# Patient Record
Sex: FEMALE | Race: WHITE | NOT HISPANIC OR LATINO | Employment: FULL TIME | URBAN - METROPOLITAN AREA
[De-identification: names, ages, dates, MRNs, and addresses within clinical notes are randomized per-mention and may not be internally consistent; named-entity substitution may affect disease eponyms.]

---

## 2023-08-29 ENCOUNTER — HOSPITAL ENCOUNTER (OUTPATIENT)
Dept: DATA CONVERSION | Facility: HOSPITAL | Age: 43
Discharge: HOME | End: 2023-08-29

## 2023-08-29 DIAGNOSIS — Z00.00 ENCOUNTER FOR GENERAL ADULT MEDICAL EXAMINATION WITHOUT ABNORMAL FINDINGS: ICD-10-CM

## 2023-08-29 DIAGNOSIS — D72.829 ELEVATED WHITE BLOOD CELL COUNT, UNSPECIFIED: ICD-10-CM

## 2023-08-29 DIAGNOSIS — D50.9 IRON DEFICIENCY ANEMIA, UNSPECIFIED: ICD-10-CM

## 2023-08-29 LAB
ALBUMIN SERPL-MCNC: 4.1 GM/DL (ref 3.5–5)
ALBUMIN/GLOB SERPL: 1.3 RATIO (ref 1.5–3)
ALP BLD-CCNC: 80 U/L (ref 35–125)
ALT SERPL-CCNC: 18 U/L (ref 5–40)
ANION GAP SERPL CALCULATED.3IONS-SCNC: 12 MMOL/L (ref 0–19)
AST SERPL-CCNC: 17 U/L (ref 5–40)
BILIRUB SERPL-MCNC: 0.2 MG/DL (ref 0.1–1.2)
BUN SERPL-MCNC: 11 MG/DL (ref 8–25)
BUN/CREAT SERPL: 13.8 RATIO (ref 8–21)
CALCIUM SERPL-MCNC: 9 MG/DL (ref 8.5–10.4)
CHLORIDE SERPL-SCNC: 104 MMOL/L (ref 97–107)
CO2 SERPL-SCNC: 23 MMOL/L (ref 24–31)
CREAT SERPL-MCNC: 0.8 MG/DL (ref 0.4–1.6)
DEPRECATED RDW RBC AUTO: 43.9 FL (ref 37–54)
ERYTHROCYTE [DISTWIDTH] IN BLOOD BY AUTOMATED COUNT: 13.5 % (ref 11.7–15)
GFR SERPL CREATININE-BSD FRML MDRD: 95 ML/MIN/1.73 M2
GLOBULIN SER-MCNC: 3.2 G/DL (ref 1.9–3.7)
GLUCOSE SERPL-MCNC: 86 MG/DL (ref 65–99)
HCT VFR BLD AUTO: 37.7 % (ref 36–44)
HGB BLD-MCNC: 12.2 GM/DL (ref 12–15)
IRON SATN MFR SERPL: 7.9 % (ref 12–50)
IRON SERPL-MCNC: 31 UG/DL (ref 30–160)
MCH RBC QN AUTO: 28.8 PG (ref 26–34)
MCHC RBC AUTO-ENTMCNC: 32.4 % (ref 31–37)
MCV RBC AUTO: 88.9 FL (ref 80–100)
NRBC BLD-RTO: 0 /100 WBC
PLATELET # BLD AUTO: 376 K/UL (ref 150–450)
PMV BLD AUTO: 9 CU (ref 7–12.6)
POTASSIUM SERPL-SCNC: 4.4 MMOL/L (ref 3.4–5.1)
PROT SERPL-MCNC: 7.3 G/DL (ref 5.9–7.9)
RBC # BLD AUTO: 4.24 M/UL (ref 4–4.9)
SODIUM SERPL-SCNC: 139 MMOL/L (ref 133–145)
TIBC SERPL-MCNC: 390 UG/DL (ref 228–428)
WBC # BLD AUTO: 7.2 K/UL (ref 4.5–11)

## 2023-09-16 VITALS
TEMPERATURE: 97.5 F | HEIGHT: 65 IN | SYSTOLIC BLOOD PRESSURE: 118 MMHG | WEIGHT: 263 LBS | HEART RATE: 77 BPM | DIASTOLIC BLOOD PRESSURE: 72 MMHG | BODY MASS INDEX: 43.82 KG/M2 | OXYGEN SATURATION: 98 %

## 2023-09-20 PROBLEM — D72.829 LEUKOCYTOSIS: Status: ACTIVE | Noted: 2023-09-20

## 2023-09-20 PROBLEM — F41.9 ANXIETY: Status: ACTIVE | Noted: 2023-09-20

## 2023-09-20 PROBLEM — D50.9 IRON DEFICIENCY ANEMIA: Status: ACTIVE | Noted: 2023-09-20

## 2023-09-20 PROBLEM — D50.9 MICROCYTIC ANEMIA: Status: ACTIVE | Noted: 2023-09-20

## 2023-09-20 PROBLEM — F41.1 ANXIETY, GENERALIZED: Status: ACTIVE | Noted: 2023-09-20

## 2023-09-20 PROBLEM — E66.01 MORBID (SEVERE) OBESITY DUE TO EXCESS CALORIES (MULTI): Status: ACTIVE | Noted: 2023-09-20

## 2023-09-20 PROBLEM — E78.5 HYPERLIPIDEMIA: Status: ACTIVE | Noted: 2023-09-20

## 2023-09-20 RX ORDER — ASCORBIC ACID 500 MG
TABLET ORAL
COMMUNITY

## 2023-09-20 RX ORDER — ALBUTEROL SULFATE 90 UG/1
AEROSOL, METERED RESPIRATORY (INHALATION)
COMMUNITY
Start: 2016-02-17 | End: 2024-02-29 | Stop reason: WASHOUT

## 2023-09-20 RX ORDER — BENZONATATE 200 MG/1
CAPSULE ORAL
COMMUNITY
Start: 2016-02-17 | End: 2024-02-29 | Stop reason: WASHOUT

## 2023-09-20 RX ORDER — ESCITALOPRAM OXALATE 20 MG/1
TABLET, FILM COATED ORAL
COMMUNITY
End: 2024-02-29 | Stop reason: SDUPTHER

## 2023-09-20 RX ORDER — AMOXICILLIN AND CLAVULANATE POTASSIUM 875; 125 MG/1; MG/1
TABLET, FILM COATED ORAL
COMMUNITY
Start: 2016-02-17 | End: 2024-02-29 | Stop reason: WASHOUT

## 2023-09-20 RX ORDER — ESCITALOPRAM OXALATE 10 MG/1
TABLET ORAL
COMMUNITY
Start: 2023-07-18 | End: 2024-02-29 | Stop reason: WASHOUT

## 2023-09-20 RX ORDER — FERROUS SULFATE 325(65) MG
TABLET, DELAYED RELEASE (ENTERIC COATED) ORAL
COMMUNITY
Start: 2022-02-09

## 2024-02-29 ENCOUNTER — LAB (OUTPATIENT)
Dept: LAB | Facility: LAB | Age: 44
End: 2024-02-29
Payer: COMMERCIAL

## 2024-02-29 ENCOUNTER — OFFICE VISIT (OUTPATIENT)
Dept: PRIMARY CARE | Facility: CLINIC | Age: 44
End: 2024-02-29
Payer: COMMERCIAL

## 2024-02-29 VITALS
TEMPERATURE: 95 F | BODY MASS INDEX: 45.16 KG/M2 | SYSTOLIC BLOOD PRESSURE: 122 MMHG | HEIGHT: 66 IN | HEART RATE: 78 BPM | DIASTOLIC BLOOD PRESSURE: 84 MMHG | WEIGHT: 281 LBS | OXYGEN SATURATION: 97 % | RESPIRATION RATE: 18 BRPM

## 2024-02-29 DIAGNOSIS — D50.8 IRON DEFICIENCY ANEMIA SECONDARY TO INADEQUATE DIETARY IRON INTAKE: ICD-10-CM

## 2024-02-29 DIAGNOSIS — F41.1 ANXIETY, GENERALIZED: Primary | ICD-10-CM

## 2024-02-29 LAB
HCT VFR BLD AUTO: 39.4 % (ref 36–46)
HGB BLD-MCNC: 12.7 G/DL (ref 12–16)
IRON SATN MFR SERPL: 30 % (ref 12–50)
IRON SERPL-MCNC: 108 UG/DL (ref 30–160)
TIBC SERPL-MCNC: 362 UG/DL (ref 228–428)
UIBC SERPL-MCNC: 254 UG/DL (ref 110–370)

## 2024-02-29 PROCEDURE — 99214 OFFICE O/P EST MOD 30 MIN: CPT | Performed by: FAMILY MEDICINE

## 2024-02-29 PROCEDURE — 85018 HEMOGLOBIN: CPT

## 2024-02-29 PROCEDURE — 36415 COLL VENOUS BLD VENIPUNCTURE: CPT

## 2024-02-29 PROCEDURE — 83540 ASSAY OF IRON: CPT

## 2024-02-29 PROCEDURE — 1036F TOBACCO NON-USER: CPT | Performed by: FAMILY MEDICINE

## 2024-02-29 PROCEDURE — 85014 HEMATOCRIT: CPT

## 2024-02-29 PROCEDURE — 83550 IRON BINDING TEST: CPT

## 2024-02-29 RX ORDER — ESCITALOPRAM OXALATE 20 MG/1
TABLET ORAL
Qty: 90 TABLET | Refills: 1 | Status: SHIPPED | OUTPATIENT
Start: 2024-02-29

## 2024-02-29 ASSESSMENT — ANXIETY QUESTIONNAIRES
3. WORRYING TOO MUCH ABOUT DIFFERENT THINGS: SEVERAL DAYS
7. FEELING AFRAID AS IF SOMETHING AWFUL MIGHT HAPPEN: NOT AT ALL
6. BECOMING EASILY ANNOYED OR IRRITABLE: NOT AT ALL
2. NOT BEING ABLE TO STOP OR CONTROL WORRYING: NOT AT ALL
4. TROUBLE RELAXING: NOT AT ALL
GAD7 TOTAL SCORE: 1
5. BEING SO RESTLESS THAT IT IS HARD TO SIT STILL: NOT AT ALL
1. FEELING NERVOUS, ANXIOUS, OR ON EDGE: NOT AT ALL
IF YOU CHECKED OFF ANY PROBLEMS ON THIS QUESTIONNAIRE, HOW DIFFICULT HAVE THESE PROBLEMS MADE IT FOR YOU TO DO YOUR WORK, TAKE CARE OF THINGS AT HOME, OR GET ALONG WITH OTHER PEOPLE: NOT DIFFICULT AT ALL

## 2024-02-29 ASSESSMENT — PATIENT HEALTH QUESTIONNAIRE - PHQ9
SUM OF ALL RESPONSES TO PHQ9 QUESTIONS 1 AND 2: 0
1. LITTLE INTEREST OR PLEASURE IN DOING THINGS: NOT AT ALL
2. FEELING DOWN, DEPRESSED OR HOPELESS: NOT AT ALL

## 2024-02-29 ASSESSMENT — LIFESTYLE VARIABLES
AUDIT-C TOTAL SCORE: 1
HOW OFTEN DO YOU HAVE SIX OR MORE DRINKS ON ONE OCCASION: NEVER
SKIP TO QUESTIONS 9-10: 1
HOW OFTEN DO YOU HAVE A DRINK CONTAINING ALCOHOL: MONTHLY OR LESS
HOW MANY STANDARD DRINKS CONTAINING ALCOHOL DO YOU HAVE ON A TYPICAL DAY: 1 OR 2

## 2024-02-29 ASSESSMENT — PAIN SCALES - GENERAL: PAINLEVEL: 0-NO PAIN

## 2024-02-29 NOTE — PATIENT INSTRUCTIONS
Problem List Items Addressed This Visit             ICD-10-CM    Anxiety, generalized - Primary F41.1    Relevant Medications    escitalopram (Lexapro) 20 mg tablet    Iron deficiency anemia D50.9    Relevant Orders    Iron and TIBC    Hemoglobin and hematocrit, blood       Additional Visit Plans:  Good mood coverage, plan to stay on current dose. Refill provided. Follow up on 6 months.    Continue current iron. Will check levels and let you know if you should keep doing this, do it more or less.     Next Wellness Exam/Annual Physical Due  8/29/2024    Patient Care Team:  Olivier Smith DO as PCP - General (Family Medicine)    Olivier Smith DO   02/29/24   8:09 AM

## 2024-02-29 NOTE — PROGRESS NOTES
Outpatient Visit Note    Chief Complaint   Patient presents with    Anxiety     Anxiety and med follow up       HPI:  Olga Pérez is a 44 y.o. female here for routine medication follow-up.    She is due for complete physical in late August.    She has anxiety with possible PTSD and has had episodes of increased diarrhea when more anxious.  Symptoms have improved on Lexapro and she has had better coverage of her mood on 20 mg once daily.  Recommendations were made for her to see GI as her sister does have ulcerative colitis and IBS for which patient may have a component herself.    Today she states that she likes the Lexapro and feels her mood is well covered.  Not having any concerning side effects.  Has good support.  Not interested in counseling.  Bowel movements have been good for the past few months. She did not see GI and will save that for if symptoms come back.     She has a history of iron deficiency anemia for which she has been on iron supplementation and continues to do so at this time, SlowFe once daily. No constipation or GI upset.  Labs were last completed August at which time iron saturation was low but hemoglobin was normal.  Plan to recheck levels at this time    PHQ9/GAD7:  Over the last 2 weeks, how often have you been bothered by any of the following problems?  Feeling nervous, anxious, or on edge: Not at all  Not being able to stop or control worrying: Not at all  Worrying too much about different things: Several days  Trouble relaxing: Not at all  Being so restless that it is hard to sit still: Not at all  Becoming easily annoyed or irritable: Not at all  Feeling afraid as if something awful might happen: Not at all  RIVKA-7 Total Score: 1      History reviewed. No pertinent past medical history.     Current Medications  Current Outpatient Medications   Medication Instructions    ascorbic acid (Vitamin C) 500 mg tablet as directed Orally    escitalopram (Lexapro) 20 mg tablet 1  tablet Orally Once a day for 90 days    ferrous sulfate 325 (65 Fe) MG EC tablet 1 tablet Orally three times per day    mv,calcium,min/iron/folic/vitK (MULTI FOR HER ORAL) as directed Orally        Allergies  Allergies   Allergen Reactions    Bee Pollens Swelling    Metronidazole Rash        History reviewed. No pertinent surgical history.  Family History   Problem Relation Name Age of Onset    Thyroid disease Mother      Nephrolithiasis Father      Hypertension Father      Cancer Maternal Grandfather       Social History     Tobacco Use    Smoking status: Never     Passive exposure: Never    Smokeless tobacco: Never   Vaping Use    Vaping Use: Never used   Substance Use Topics    Alcohol use: Yes    Drug use: Never     Tobacco Use: Low Risk  (2/29/2024)    Patient History     Smoking Tobacco Use: Never     Smokeless Tobacco Use: Never     Passive Exposure: Never        ROS  All pertinent positive symptoms are included in the history of present illness.  All other systems have been reviewed and are negative and noncontributory to this patient's current ailments.    VITAL SIGNS  Vitals:    02/29/24 0803   BP: 122/84   Pulse: 78   Resp: 18   Temp: 35 °C (95 °F)   SpO2: 97%     Vitals:    02/29/24 0803   Weight: 127 kg (281 lb)      Body mass index is 45.35 kg/m².     PHYSICAL EXAM  GENERAL APPEARANCE: well nourished, well developed, looks like stated age, in no acute distress, not ill or tired appearing, conversing well.   HEENT: no trauma, normocephalic.   NECK: supple without rigidity, no neck mass was observed.   LUNGS: good chest wall expansion. In no acute respiratory distress.   EXTREMITIES: moving all extremities equally with no edema.   SKIN: normal skin color and pigmentation, without rash.   NEUROLOGIC EXAM: CN II-XII grossly intact, normal gait.   PSYCH: mood and affect appropriate; alert and oriented to time, place, person; no difficulty with speech or language.       Assessment/Plan   Problem List Items  Addressed This Visit             ICD-10-CM    Anxiety, generalized - Primary F41.1    Relevant Medications    escitalopram (Lexapro) 20 mg tablet    Iron deficiency anemia D50.9    Relevant Orders    Iron and TIBC (Completed)    Hemoglobin and hematocrit, blood (Completed)       Additional Visit Plans:  Good mood coverage, plan to stay on current dose. Refill provided. Follow up on 6 months.    Continue current iron. Will check levels and let you know if you should keep doing this, do it more or less.     Next Wellness Exam/Annual Physical Due  8/29/2024    Patient Care Team:  Olivier Smith DO as PCP - General (Family Medicine)    Olivier Smith DO   03/05/24   8:19 AM

## 2024-12-13 ENCOUNTER — LAB (OUTPATIENT)
Dept: LAB | Facility: LAB | Age: 44
End: 2024-12-13
Payer: COMMERCIAL

## 2024-12-13 ENCOUNTER — OFFICE VISIT (OUTPATIENT)
Dept: PRIMARY CARE | Facility: CLINIC | Age: 44
End: 2024-12-13
Payer: COMMERCIAL

## 2024-12-13 ENCOUNTER — HOSPITAL ENCOUNTER (OUTPATIENT)
Dept: RADIOLOGY | Facility: HOSPITAL | Age: 44
Discharge: HOME | End: 2024-12-13
Payer: COMMERCIAL

## 2024-12-13 VITALS
DIASTOLIC BLOOD PRESSURE: 76 MMHG | SYSTOLIC BLOOD PRESSURE: 124 MMHG | TEMPERATURE: 96.2 F | BODY MASS INDEX: 47.09 KG/M2 | OXYGEN SATURATION: 98 % | WEIGHT: 293 LBS | HEART RATE: 77 BPM | HEIGHT: 66 IN

## 2024-12-13 DIAGNOSIS — M79.672 PAIN OF LEFT HEEL: ICD-10-CM

## 2024-12-13 DIAGNOSIS — Z00.00 ROUTINE HEALTH MAINTENANCE: Primary | ICD-10-CM

## 2024-12-13 DIAGNOSIS — D50.8 IRON DEFICIENCY ANEMIA SECONDARY TO INADEQUATE DIETARY IRON INTAKE: ICD-10-CM

## 2024-12-13 DIAGNOSIS — M72.2 PLANTAR FASCIITIS, LEFT: ICD-10-CM

## 2024-12-13 DIAGNOSIS — Z12.31 BREAST CANCER SCREENING BY MAMMOGRAM: ICD-10-CM

## 2024-12-13 DIAGNOSIS — Z00.00 ROUTINE HEALTH MAINTENANCE: ICD-10-CM

## 2024-12-13 DIAGNOSIS — F41.1 ANXIETY, GENERALIZED: ICD-10-CM

## 2024-12-13 LAB
ERYTHROCYTE [DISTWIDTH] IN BLOOD BY AUTOMATED COUNT: 13.2 % (ref 11.5–14.5)
HCT VFR BLD AUTO: 38.8 % (ref 36–46)
HGB BLD-MCNC: 12.5 G/DL (ref 12–16)
MCH RBC QN AUTO: 29.7 PG (ref 26–34)
MCHC RBC AUTO-ENTMCNC: 32.2 G/DL (ref 32–36)
MCV RBC AUTO: 92 FL (ref 80–100)
NRBC BLD-RTO: 0 /100 WBCS (ref 0–0)
PLATELET # BLD AUTO: 342 X10*3/UL (ref 150–450)
RBC # BLD AUTO: 4.21 X10*6/UL (ref 4–5.2)
TSH SERPL-ACNC: 1.94 MIU/L (ref 0.44–3.98)
WBC # BLD AUTO: 9.1 X10*3/UL (ref 4.4–11.3)

## 2024-12-13 PROCEDURE — 36415 COLL VENOUS BLD VENIPUNCTURE: CPT

## 2024-12-13 PROCEDURE — 84443 ASSAY THYROID STIM HORMONE: CPT

## 2024-12-13 PROCEDURE — 3008F BODY MASS INDEX DOCD: CPT | Performed by: FAMILY MEDICINE

## 2024-12-13 PROCEDURE — 99214 OFFICE O/P EST MOD 30 MIN: CPT | Performed by: FAMILY MEDICINE

## 2024-12-13 PROCEDURE — 85027 COMPLETE CBC AUTOMATED: CPT

## 2024-12-13 PROCEDURE — 1036F TOBACCO NON-USER: CPT | Performed by: FAMILY MEDICINE

## 2024-12-13 PROCEDURE — 73630 X-RAY EXAM OF FOOT: CPT | Mod: LT

## 2024-12-13 RX ORDER — ESCITALOPRAM OXALATE 20 MG/1
TABLET ORAL
Qty: 90 TABLET | Refills: 3 | Status: SHIPPED | OUTPATIENT
Start: 2024-12-13

## 2024-12-13 ASSESSMENT — PAIN SCALES - GENERAL: PAINLEVEL_OUTOF10: 6

## 2024-12-13 ASSESSMENT — ANXIETY QUESTIONNAIRES
4. TROUBLE RELAXING: NOT AT ALL
1. FEELING NERVOUS, ANXIOUS, OR ON EDGE: NOT AT ALL
5. BEING SO RESTLESS THAT IT IS HARD TO SIT STILL: NOT AT ALL
IF YOU CHECKED OFF ANY PROBLEMS ON THIS QUESTIONNAIRE, HOW DIFFICULT HAVE THESE PROBLEMS MADE IT FOR YOU TO DO YOUR WORK, TAKE CARE OF THINGS AT HOME, OR GET ALONG WITH OTHER PEOPLE: NOT DIFFICULT AT ALL
GAD7 TOTAL SCORE: 1
7. FEELING AFRAID AS IF SOMETHING AWFUL MIGHT HAPPEN: NOT AT ALL
3. WORRYING TOO MUCH ABOUT DIFFERENT THINGS: SEVERAL DAYS
6. BECOMING EASILY ANNOYED OR IRRITABLE: NOT AT ALL
2. NOT BEING ABLE TO STOP OR CONTROL WORRYING: NOT AT ALL

## 2024-12-13 ASSESSMENT — PATIENT HEALTH QUESTIONNAIRE - PHQ9
1. LITTLE INTEREST OR PLEASURE IN DOING THINGS: NOT AT ALL
SUM OF ALL RESPONSES TO PHQ9 QUESTIONS 1 AND 2: 0
2. FEELING DOWN, DEPRESSED OR HOPELESS: NOT AT ALL

## 2024-12-13 NOTE — PATIENT INSTRUCTIONS
Problem List Items Addressed This Visit             ICD-10-CM    Anxiety, generalized F41.1    Relevant Medications    escitalopram (Lexapro) 20 mg tablet    Iron deficiency anemia D50.9    Relevant Orders    Iron and TIBC     Other Visit Diagnoses         Codes    Routine health maintenance    -  Primary Z00.00    Relevant Orders    Comprehensive Metabolic Panel    Lipid Panel    CBC    TSH with reflex to Free T4 if abnormal    Breast cancer screening by mammogram     Z12.31    Relevant Orders    BI mammo bilateral screening tomosynthesis    Plantar fasciitis, left     M72.2    Relevant Orders    XR foot left 3+ views    Pain of left heel     M79.672    Relevant Orders    XR foot left 3+ views            Additional Visit Plans:  Consider inserts or shoes from [employer]. Roll foot on a frozen bottle of water. Ibuprofen or tylenol as needed. Plan for xray to rule out fracture.     Plan for mammogram before end of year if possible due to insurance change.     Doing well on lexapro, refill given for a year.     Iron is every other day. Will do routine labs and let you know if this is enough.         Patient Care Team:  Olivier Smith DO as PCP - General (Family Medicine)    Olivier Smith DO   12/13/24   8:09 AM

## 2024-12-13 NOTE — PROGRESS NOTES
Outpatient Visit Note    Chief Complaint   Patient presents with    Follow-up     Med f/u       HPI:  Olga Pérez is a 44 y.o. female here to follow-up on her anxiety.    She has anxiety with possible PTSD that triggers episodes of increased diarrhea.  Symptoms are improved on Lexapro and she is doing well on 20 mg once daily.  I did previously make recommendations for her to see GI as her sister does have ulcerative colitis and IBS but patient felt symptoms were well-managed on Lexapro and did not pursue this previously. Still not looking to pursue this yet.     Today she states that the Lexapro is working well, no side effects.  Denies any suicidal or homicidal ideation, has good support.    She has a history of iron deficiency anemia for which she has been on iron supplementation and continues to do so at this time, SlowFe once every other day. No constipation or GI upset.      Left heel has been hurting on and off, sometimes has a swollen bump. Has plantar fasciitis. No trauma.     PHQ9/GAD7:  Over the last 2 weeks, how often have you been bothered by any of the following problems?  Feeling nervous, anxious, or on edge: Not at all  Not being able to stop or control worrying: Not at all  Worrying too much about different things: Several days  Trouble relaxing: Not at all  Being so restless that it is hard to sit still: Not at all  Becoming easily annoyed or irritable: Not at all  Feeling afraid as if something awful might happen: Not at all  RIVKA-7 Total Score: 1      Patient Active Problem List    Diagnosis Date Noted    Anxiety 09/20/2023    Anxiety, generalized 09/20/2023    Hyperlipidemia 09/20/2023    Leukocytosis 09/20/2023    Iron deficiency anemia 09/20/2023    Microcytic anemia 09/20/2023    Morbid (severe) obesity due to excess calories (Multi) 09/20/2023        Past Medical History:   Diagnosis Date    Anxiety     Varicella         Current Medications  Current Outpatient Medications    Medication Instructions    ascorbic acid (Vitamin C) 500 mg tablet as directed Orally    escitalopram (Lexapro) 20 mg tablet TAKE 1 TABLET BY MOUTH EVERY DAY    ferrous sulfate 325 (65 Fe) MG EC tablet 1 tablet every other day.    mv,calcium,min/iron/folic/vitK (MULTI FOR HER ORAL) as directed Orally        Allergies  Allergies   Allergen Reactions    Bee Pollens Swelling    Metronidazole Rash        Past Surgical History:   Procedure Laterality Date    WISDOM TOOTH EXTRACTION       Family History   Problem Relation Name Age of Onset    Thyroid disease Mother      Nephrolithiasis Father      Hypertension Father      Cancer Maternal Grandfather [facility]      Social History     Tobacco Use    Smoking status: Former     Current packs/day: 0.25     Average packs/day: 0.3 packs/day for 4.0 years (1.0 ttl pk-yrs)     Types: Cigarettes     Passive exposure: Never    Smokeless tobacco: Never   Vaping Use    Vaping status: Never Used   Substance Use Topics    Alcohol use: Yes     Comment: At events. Few drinks    Drug use: Never     Tobacco Use: Medium Risk (12/13/2024)    Patient History     Smoking Tobacco Use: Former     Smokeless Tobacco Use: Never     Passive Exposure: Never        ROS  All pertinent positive symptoms are included in the history of present illness.  All other systems have been reviewed and are negative and noncontributory to this patient's current ailments.    VITAL SIGNS  Vitals:    12/13/24 0803   BP: 124/76   Pulse: 77   Temp: 35.7 °C (96.2 °F)   SpO2: 98%     Vitals:    12/13/24 0803   Weight: 133 kg (294 lb)      Body mass index is 47.45 kg/m².     PHYSICAL EXAM  GENERAL APPEARANCE: well nourished, well developed, looks like stated age, in no acute distress, not ill or tired appearing, conversing well.   HEENT: no trauma, normocephalic.   NECK: supple without rigidity, no neck mass was observed.   LUNGS: good chest wall expansion. In no acute respiratory distress.   EXTREMITIES: moving all  extremities equally with no edema.  No palpable mass along the plantar surface of her left foot but does have some tenderness where the plantar fascia attaches to the heel  SKIN: normal skin color and pigmentation, without rash.   NEUROLOGIC EXAM: CN II-XII grossly intact, normal gait.   PSYCH: mood and affect appropriate; alert and oriented to time, place, person; no difficulty with speech or language.     Counseling:       Medication education:           Education:  The patient is counseled regarding potential side-effects of all new medications          Understanding:  Patient expressed understanding of information conveyed today          Adherence:  No barriers to adherence identified     Assessment/Plan   Problem List Items Addressed This Visit             ICD-10-CM    Anxiety, generalized F41.1    Relevant Medications    escitalopram (Lexapro) 20 mg tablet    Iron deficiency anemia D50.9    Relevant Orders    Iron and TIBC     Other Visit Diagnoses         Codes    Routine health maintenance    -  Primary Z00.00    Relevant Orders    Comprehensive Metabolic Panel    Lipid Panel    CBC    TSH with reflex to Free T4 if abnormal    Breast cancer screening by mammogram     Z12.31    Relevant Orders    BI mammo bilateral screening tomosynthesis    Plantar fasciitis, left     M72.2    Relevant Orders    XR foot left 3+ views    Pain of left heel     M79.672    Relevant Orders    XR foot left 3+ views            Additional Visit Plans:  Consider inserts or shoes from [facility]. Roll foot on a frozen bottle of water. Ibuprofen or tylenol as needed. Plan for xray to rule out fracture.     Plan for mammogram before end of year if possible due to insurance change.     Doing well on lexapro, refill given for a year.     Iron is every other day. Will do routine labs and let you know if this is enough.         Patient Care Team:  Olivier Smith DO as PCP - General (Family Medicine)    Olivier Simth DO   12/13/24    8:09 AM

## 2024-12-16 ENCOUNTER — APPOINTMENT (OUTPATIENT)
Dept: PRIMARY CARE | Facility: CLINIC | Age: 44
End: 2024-12-16
Payer: COMMERCIAL

## 2025-01-06 DIAGNOSIS — Z00.00 ROUTINE HEALTH MAINTENANCE: Primary | ICD-10-CM

## 2025-01-06 DIAGNOSIS — D50.8 IRON DEFICIENCY ANEMIA SECONDARY TO INADEQUATE DIETARY IRON INTAKE: ICD-10-CM
